# Patient Record
Sex: MALE | Race: WHITE | NOT HISPANIC OR LATINO | URBAN - METROPOLITAN AREA
[De-identification: names, ages, dates, MRNs, and addresses within clinical notes are randomized per-mention and may not be internally consistent; named-entity substitution may affect disease eponyms.]

---

## 2025-02-12 ENCOUNTER — OFFICE VISIT (OUTPATIENT)
Dept: URGENT CARE | Age: 30
End: 2025-02-12
Payer: COMMERCIAL

## 2025-02-12 ENCOUNTER — APPOINTMENT (OUTPATIENT)
Dept: URGENT CARE | Age: 30
End: 2025-02-12
Payer: COMMERCIAL

## 2025-02-12 VITALS
DIASTOLIC BLOOD PRESSURE: 94 MMHG | SYSTOLIC BLOOD PRESSURE: 132 MMHG | WEIGHT: 175 LBS | RESPIRATION RATE: 16 BRPM | HEART RATE: 81 BPM | TEMPERATURE: 97.7 F | OXYGEN SATURATION: 100 % | BODY MASS INDEX: 22.46 KG/M2 | HEIGHT: 74 IN

## 2025-02-12 RX ORDER — PAROXETINE HYDROCHLORIDE 40 MG/1
40 TABLET, FILM COATED ORAL
COMMUNITY
Start: 2025-01-28

## 2025-02-13 ENCOUNTER — OFFICE VISIT (OUTPATIENT)
Dept: PRIMARY CARE | Facility: CLINIC | Age: 30
End: 2025-02-13
Payer: COMMERCIAL

## 2025-02-13 VITALS
SYSTOLIC BLOOD PRESSURE: 132 MMHG | WEIGHT: 180 LBS | DIASTOLIC BLOOD PRESSURE: 68 MMHG | HEIGHT: 74 IN | BODY MASS INDEX: 23.1 KG/M2

## 2025-02-13 DIAGNOSIS — M79.671 RIGHT FOOT PAIN: ICD-10-CM

## 2025-02-13 DIAGNOSIS — T14.8XXA PUNCTURE WOUND: Primary | ICD-10-CM

## 2025-02-13 PROCEDURE — 3008F BODY MASS INDEX DOCD: CPT | Performed by: INTERNAL MEDICINE

## 2025-02-13 PROCEDURE — 99203 OFFICE O/P NEW LOW 30 MIN: CPT | Performed by: INTERNAL MEDICINE

## 2025-02-13 RX ORDER — IBUPROFEN 600 MG/1
600 TABLET ORAL 3 TIMES DAILY
Qty: 60 TABLET | Refills: 0 | Status: SHIPPED | OUTPATIENT
Start: 2025-02-13

## 2025-02-13 ASSESSMENT — ENCOUNTER SYMPTOMS
DEPRESSION: 1
LOSS OF SENSATION IN FEET: 0
OCCASIONAL FEELINGS OF UNSTEADINESS: 0

## 2025-02-13 NOTE — LETTER
February 13, 2025     Patient: David Garg   YOB: 1995   Date of Visit: 2/13/2025       To Whom It May Concern:    David Garg was seen in my clinic on 2/13/2025 at 10:30 am. Please excuse David for his absence today 02/13/25 and 2/14/25. David can return to work on Monday 2/17/25.    If you have any questions or concerns, please don't hesitate to call.         Sincerely,         Rose Mendiola MD

## 2025-02-13 NOTE — PROGRESS NOTES
"Subjective   Patient ID: David Garg is a 29 y.o. male who presents for New Patient Visit.    HPI   New patient visit  Patient wants FMLA for the time he took off.  He stepped on the nail on left heel a week ago.  He went to the pharmacy and got a tetanus shot.  Now he wants FMLA filled he tried to go to urgent care and they said they do not feel it.  He tried [facility] and advised to establish PCP.  He says still hurts and has pain and walking  Patient is very rude in his approach and threatening to contact state board complaining about him getting thrown around by the urgent care daily doctor and here    Past medical history: Anxiety depression  Occupation:   Social history: Non-smoker occasionally drinks uses marijuana  Family history: Mother side Alzheimer's  Review of Systems    Objective   /68   Ht 1.88 m (6' 2\")   Wt 81.6 kg (180 lb)   BMI 23.11 kg/m²     Physical Exam  Vitals reviewed.   Constitutional:       Appearance: Normal appearance.   HENT:      Head: Normocephalic and atraumatic.      Right Ear: Tympanic membrane, ear canal and external ear normal.      Left Ear: Tympanic membrane, ear canal and external ear normal.      Nose: Nose normal.      Mouth/Throat:      Pharynx: Oropharynx is clear.   Eyes:      Extraocular Movements: Extraocular movements intact.      Conjunctiva/sclera: Conjunctivae normal.      Pupils: Pupils are equal, round, and reactive to light.   Cardiovascular:      Rate and Rhythm: Normal rate and regular rhythm.      Pulses: Normal pulses.      Heart sounds: Normal heart sounds.   Pulmonary:      Effort: Pulmonary effort is normal.      Breath sounds: Normal breath sounds.   Abdominal:      General: Abdomen is flat. Bowel sounds are normal.      Palpations: Abdomen is soft.   Musculoskeletal:      Cervical back: Normal range of motion and neck supple.   Skin:     General: Skin is warm and dry.      Comments: Very small puncture wound on the left heel " there is superficial no surrounding erythema no induration   Neurological:      General: No focal deficit present.      Mental Status: He is alert and oriented to person, place, and time.   Psychiatric:         Mood and Affect: Mood normal.         Assessment/Plan   Problem List Items Addressed This Visit    None  Visit Diagnoses         Codes    Puncture wound    -  Primary T14.8XXA    Right foot pain     M79.671    Relevant Medications    ibuprofen 600 mg tablet          Clinically the area of concern is very unremarkable.  The site of puncture wound is very tiny small with no induration no erythema surrounding it  Will treat with Motrin 600 mg 3 times daily  If pain is not improved he needs to see the podiatrist   explained patient that I cannot fill his FMLA for the week EMS because he was not in my care.  He also did not see any doctors or did not go to urgent care so I do not have any information available.  Because of his complaints of pain we will give him time off today and tomorrow